# Patient Record
Sex: FEMALE | Race: BLACK OR AFRICAN AMERICAN | NOT HISPANIC OR LATINO | Employment: OTHER | RURAL
[De-identification: names, ages, dates, MRNs, and addresses within clinical notes are randomized per-mention and may not be internally consistent; named-entity substitution may affect disease eponyms.]

---

## 2020-08-27 ENCOUNTER — HISTORICAL (OUTPATIENT)
Dept: ADMINISTRATIVE | Facility: HOSPITAL | Age: 70
End: 2020-08-27

## 2021-06-02 ENCOUNTER — HOSPITAL ENCOUNTER (EMERGENCY)
Facility: HOSPITAL | Age: 71
Discharge: HOME | End: 2021-06-02
Attending: EMERGENCY MEDICINE
Payer: MEDICARE

## 2021-06-02 VITALS
HEART RATE: 89 BPM | SYSTOLIC BLOOD PRESSURE: 157 MMHG | WEIGHT: 157 LBS | BODY MASS INDEX: 28.89 KG/M2 | RESPIRATION RATE: 18 BRPM | HEIGHT: 62 IN | OXYGEN SATURATION: 99 % | TEMPERATURE: 99 F | DIASTOLIC BLOOD PRESSURE: 53 MMHG

## 2021-06-02 DIAGNOSIS — R52 PAIN: ICD-10-CM

## 2021-06-02 DIAGNOSIS — M16.0 OSTEOARTHRITIS OF BOTH HIPS, UNSPECIFIED OSTEOARTHRITIS TYPE: ICD-10-CM

## 2021-06-02 DIAGNOSIS — I87.2 VENOUS INSUFFICIENCY OF BOTH LOWER EXTREMITIES: Primary | ICD-10-CM

## 2021-06-02 PROCEDURE — 99282 EMERGENCY DEPT VISIT SF MDM: CPT | Performed by: EMERGENCY MEDICINE

## 2021-06-02 PROCEDURE — 99283 EMERGENCY DEPT VISIT LOW MDM: CPT | Performed by: EMERGENCY MEDICINE

## 2021-06-02 PROCEDURE — 99284 EMERGENCY DEPT VISIT MOD MDM: CPT | Mod: 25

## 2021-06-02 RX ORDER — POTASSIUM CHLORIDE 1125 MG/1
20 TABLET, EXTENDED RELEASE ORAL DAILY
COMMUNITY

## 2021-06-02 RX ORDER — LATANOPROST 50 UG/ML
1 SOLUTION/ DROPS OPHTHALMIC NIGHTLY
COMMUNITY

## 2021-06-02 RX ORDER — FERROUS SULFATE 325(65) MG
325 TABLET, DELAYED RELEASE (ENTERIC COATED) ORAL
COMMUNITY

## 2021-06-02 RX ORDER — FUROSEMIDE 20 MG/1
20 TABLET ORAL DAILY
COMMUNITY

## 2021-06-02 RX ORDER — DEXAMETHASONE 1.5 MG/1
1.5 TABLET ORAL
COMMUNITY

## 2021-06-02 RX ORDER — HYDROCHLOROTHIAZIDE 25 MG/1
25 TABLET ORAL DAILY
COMMUNITY

## 2021-06-02 RX ORDER — METFORMIN HYDROCHLORIDE EXTENDED-RELEASE TABLETS 1000 MG/1
1000 TABLET, FILM COATED, EXTENDED RELEASE ORAL
COMMUNITY

## 2021-06-02 RX ORDER — TIZANIDINE 2 MG/1
4 TABLET ORAL EVERY 6 HOURS PRN
COMMUNITY

## 2021-06-02 RX ORDER — FLUOROMETHOLONE 1 MG/ML
1 SUSPENSION/ DROPS OPHTHALMIC 2 TIMES DAILY
COMMUNITY

## 2021-06-02 RX ORDER — QUINAPRIL 40 MG/1
40 TABLET ORAL NIGHTLY
COMMUNITY

## 2021-06-02 RX ORDER — NAPROXEN SODIUM 220 MG/1
81 TABLET, FILM COATED ORAL DAILY
COMMUNITY

## 2021-06-02 RX ORDER — LANOLIN ALCOHOL/MO/W.PET/CERES
400 CREAM (GRAM) TOPICAL DAILY
COMMUNITY

## 2021-06-02 RX ORDER — ATORVASTATIN CALCIUM 20 MG/1
20 TABLET, FILM COATED ORAL DAILY
COMMUNITY

## 2021-07-29 ENCOUNTER — HOSPITAL ENCOUNTER (OUTPATIENT)
Dept: RADIOLOGY | Facility: HOSPITAL | Age: 71
Discharge: HOME | End: 2021-07-29
Attending: ORTHOPAEDIC SURGERY
Payer: MEDICARE

## 2021-07-29 ENCOUNTER — OFFICE VISIT (OUTPATIENT)
Dept: ORTHOPEDICS | Facility: CLINIC | Age: 71
End: 2021-07-29
Payer: MEDICARE

## 2021-07-29 VITALS — WEIGHT: 157 LBS | HEIGHT: 62 IN | BODY MASS INDEX: 28.89 KG/M2

## 2021-07-29 DIAGNOSIS — M25.572 BILATERAL ANKLE PAIN: ICD-10-CM

## 2021-07-29 DIAGNOSIS — M25.571 BILATERAL ANKLE PAIN: Primary | ICD-10-CM

## 2021-07-29 DIAGNOSIS — R60.0 EDEMA OF BOTH LOWER EXTREMITIES: ICD-10-CM

## 2021-07-29 DIAGNOSIS — M25.572 BILATERAL ANKLE PAIN: Primary | ICD-10-CM

## 2021-07-29 DIAGNOSIS — M25.571 BILATERAL ANKLE PAIN: ICD-10-CM

## 2021-07-29 PROCEDURE — 99203 OFFICE O/P NEW LOW 30 MIN: CPT | Mod: ,,, | Performed by: ORTHOPAEDIC SURGERY

## 2021-07-29 PROCEDURE — 73610 PR  X-RAY ANKLE 3+ VW: ICD-10-PCS | Mod: 26,LT,, | Performed by: ORTHOPAEDIC SURGERY

## 2021-07-29 PROCEDURE — 99203 PR OFFICE/OUTPT VISIT, NEW, LEVL III, 30-44 MIN: ICD-10-PCS | Mod: ,,, | Performed by: ORTHOPAEDIC SURGERY

## 2021-07-29 PROCEDURE — 73610 X-RAY EXAM OF ANKLE: CPT | Mod: 26,RT,, | Performed by: ORTHOPAEDIC SURGERY

## 2021-07-29 PROCEDURE — 73610 X-RAY EXAM OF ANKLE: CPT | Mod: TC,50

## 2021-07-29 PROCEDURE — 73610 X-RAY EXAM OF ANKLE: CPT | Mod: 26,LT,, | Performed by: ORTHOPAEDIC SURGERY

## 2021-09-28 ENCOUNTER — OFFICE VISIT (OUTPATIENT)
Dept: VASCULAR SURGERY | Facility: CLINIC | Age: 71
End: 2021-09-28
Payer: MEDICARE

## 2021-09-28 VITALS
BODY MASS INDEX: 27.3 KG/M2 | HEIGHT: 62 IN | SYSTOLIC BLOOD PRESSURE: 130 MMHG | DIASTOLIC BLOOD PRESSURE: 60 MMHG | HEART RATE: 88 BPM | RESPIRATION RATE: 12 BRPM | WEIGHT: 148.38 LBS

## 2021-09-28 DIAGNOSIS — M79.604 LEG PAIN, BILATERAL: Primary | ICD-10-CM

## 2021-09-28 DIAGNOSIS — R60.0 LOWER EXTREMITY EDEMA: ICD-10-CM

## 2021-09-28 DIAGNOSIS — M79.605 LEG PAIN, BILATERAL: Primary | ICD-10-CM

## 2021-09-28 DIAGNOSIS — M25.571 BILATERAL ANKLE PAIN: ICD-10-CM

## 2021-09-28 DIAGNOSIS — M25.572 BILATERAL ANKLE PAIN: ICD-10-CM

## 2021-09-28 DIAGNOSIS — L81.9 HYPERPIGMENTATION OF SKIN: ICD-10-CM

## 2021-09-28 PROCEDURE — 4010F PR ACE/ARB THEARPY RXD/TAKEN: ICD-10-PCS | Mod: CPTII,,, | Performed by: FAMILY MEDICINE

## 2021-09-28 PROCEDURE — 1160F RVW MEDS BY RX/DR IN RCRD: CPT | Mod: CPTII,,, | Performed by: FAMILY MEDICINE

## 2021-09-28 PROCEDURE — 3008F BODY MASS INDEX DOCD: CPT | Mod: CPTII,,, | Performed by: FAMILY MEDICINE

## 2021-09-28 PROCEDURE — 4010F ACE/ARB THERAPY RXD/TAKEN: CPT | Mod: CPTII,,, | Performed by: FAMILY MEDICINE

## 2021-09-28 PROCEDURE — 1160F PR REVIEW ALL MEDS BY PRESCRIBER/CLIN PHARMACIST DOCUMENTED: ICD-10-PCS | Mod: CPTII,,, | Performed by: FAMILY MEDICINE

## 2021-09-28 PROCEDURE — 99215 OFFICE O/P EST HI 40 MIN: CPT | Mod: PBBFAC | Performed by: FAMILY MEDICINE

## 2021-09-28 PROCEDURE — 1159F PR MEDICATION LIST DOCUMENTED IN MEDICAL RECORD: ICD-10-PCS | Mod: CPTII,,, | Performed by: FAMILY MEDICINE

## 2021-09-28 PROCEDURE — 3078F DIAST BP <80 MM HG: CPT | Mod: CPTII,,, | Performed by: FAMILY MEDICINE

## 2021-09-28 PROCEDURE — 99203 OFFICE O/P NEW LOW 30 MIN: CPT | Mod: S$PBB,,, | Performed by: FAMILY MEDICINE

## 2021-09-28 PROCEDURE — 3078F PR MOST RECENT DIASTOLIC BLOOD PRESSURE < 80 MM HG: ICD-10-PCS | Mod: CPTII,,, | Performed by: FAMILY MEDICINE

## 2021-09-28 PROCEDURE — 3075F PR MOST RECENT SYSTOLIC BLOOD PRESS GE 130-139MM HG: ICD-10-PCS | Mod: CPTII,,, | Performed by: FAMILY MEDICINE

## 2021-09-28 PROCEDURE — 1159F MED LIST DOCD IN RCRD: CPT | Mod: CPTII,,, | Performed by: FAMILY MEDICINE

## 2021-09-28 PROCEDURE — 99203 PR OFFICE/OUTPT VISIT, NEW, LEVL III, 30-44 MIN: ICD-10-PCS | Mod: S$PBB,,, | Performed by: FAMILY MEDICINE

## 2021-09-28 PROCEDURE — 3075F SYST BP GE 130 - 139MM HG: CPT | Mod: CPTII,,, | Performed by: FAMILY MEDICINE

## 2021-09-28 PROCEDURE — 3008F PR BODY MASS INDEX (BMI) DOCUMENTED: ICD-10-PCS | Mod: CPTII,,, | Performed by: FAMILY MEDICINE

## 2021-09-28 RX ORDER — ISOPROPYL ALCOHOL 0.75 G/1
SWAB TOPICAL
COMMUNITY
Start: 2021-09-10

## 2021-09-28 RX ORDER — CALCIUM CITRATE/VITAMIN D3 200MG-6.25
TABLET ORAL
COMMUNITY
Start: 2021-09-08

## 2021-09-28 RX ORDER — LANCETS 33 GAUGE
EACH MISCELLANEOUS
COMMUNITY
Start: 2021-09-13

## 2021-10-13 ENCOUNTER — HOSPITAL ENCOUNTER (OUTPATIENT)
Dept: RADIOLOGY | Facility: HOSPITAL | Age: 71
Discharge: HOME | End: 2021-10-13
Attending: FAMILY MEDICINE
Payer: MEDICARE

## 2021-10-13 ENCOUNTER — OFFICE VISIT (OUTPATIENT)
Dept: VASCULAR SURGERY | Facility: CLINIC | Age: 71
End: 2021-10-13
Payer: MEDICARE

## 2021-10-13 VITALS
WEIGHT: 148 LBS | BODY MASS INDEX: 27.23 KG/M2 | HEIGHT: 62 IN | SYSTOLIC BLOOD PRESSURE: 120 MMHG | HEART RATE: 80 BPM | DIASTOLIC BLOOD PRESSURE: 60 MMHG | RESPIRATION RATE: 14 BRPM

## 2021-10-13 DIAGNOSIS — R60.0 LOWER EXTREMITY EDEMA: ICD-10-CM

## 2021-10-13 DIAGNOSIS — M79.605 LEG PAIN, BILATERAL: Primary | ICD-10-CM

## 2021-10-13 DIAGNOSIS — L81.9 HYPERPIGMENTATION OF SKIN: ICD-10-CM

## 2021-10-13 DIAGNOSIS — M79.604 LEG PAIN, BILATERAL: Primary | ICD-10-CM

## 2021-10-13 PROCEDURE — 1159F PR MEDICATION LIST DOCUMENTED IN MEDICAL RECORD: ICD-10-PCS | Mod: CPTII,,, | Performed by: FAMILY MEDICINE

## 2021-10-13 PROCEDURE — 99214 OFFICE O/P EST MOD 30 MIN: CPT | Mod: S$PBB,,, | Performed by: FAMILY MEDICINE

## 2021-10-13 PROCEDURE — 3008F BODY MASS INDEX DOCD: CPT | Mod: CPTII,,, | Performed by: FAMILY MEDICINE

## 2021-10-13 PROCEDURE — 3074F SYST BP LT 130 MM HG: CPT | Mod: CPTII,,, | Performed by: FAMILY MEDICINE

## 2021-10-13 PROCEDURE — 4010F ACE/ARB THERAPY RXD/TAKEN: CPT | Mod: CPTII,,, | Performed by: FAMILY MEDICINE

## 2021-10-13 PROCEDURE — 93970 EXTREMITY STUDY: CPT | Mod: 26,,, | Performed by: FAMILY MEDICINE

## 2021-10-13 PROCEDURE — 93970 US VENOUS REFLUX STUDY BILATERAL: ICD-10-PCS | Mod: 26,,, | Performed by: FAMILY MEDICINE

## 2021-10-13 PROCEDURE — 1159F MED LIST DOCD IN RCRD: CPT | Mod: CPTII,,, | Performed by: FAMILY MEDICINE

## 2021-10-13 PROCEDURE — 3074F PR MOST RECENT SYSTOLIC BLOOD PRESSURE < 130 MM HG: ICD-10-PCS | Mod: CPTII,,, | Performed by: FAMILY MEDICINE

## 2021-10-13 PROCEDURE — 3008F PR BODY MASS INDEX (BMI) DOCUMENTED: ICD-10-PCS | Mod: CPTII,,, | Performed by: FAMILY MEDICINE

## 2021-10-13 PROCEDURE — 3078F DIAST BP <80 MM HG: CPT | Mod: CPTII,,, | Performed by: FAMILY MEDICINE

## 2021-10-13 PROCEDURE — 4010F PR ACE/ARB THEARPY RXD/TAKEN: ICD-10-PCS | Mod: CPTII,,, | Performed by: FAMILY MEDICINE

## 2021-10-13 PROCEDURE — 1160F RVW MEDS BY RX/DR IN RCRD: CPT | Mod: CPTII,,, | Performed by: FAMILY MEDICINE

## 2021-10-13 PROCEDURE — 1160F PR REVIEW ALL MEDS BY PRESCRIBER/CLIN PHARMACIST DOCUMENTED: ICD-10-PCS | Mod: CPTII,,, | Performed by: FAMILY MEDICINE

## 2021-10-13 PROCEDURE — 99215 OFFICE O/P EST HI 40 MIN: CPT | Mod: PBBFAC,25 | Performed by: FAMILY MEDICINE

## 2021-10-13 PROCEDURE — 3078F PR MOST RECENT DIASTOLIC BLOOD PRESSURE < 80 MM HG: ICD-10-PCS | Mod: CPTII,,, | Performed by: FAMILY MEDICINE

## 2021-10-13 PROCEDURE — 93970 EXTREMITY STUDY: CPT | Mod: TC

## 2021-10-13 PROCEDURE — 99214 PR OFFICE/OUTPT VISIT, EST, LEVL IV, 30-39 MIN: ICD-10-PCS | Mod: S$PBB,,, | Performed by: FAMILY MEDICINE

## 2022-10-26 ENCOUNTER — HOSPITAL ENCOUNTER (OUTPATIENT)
Dept: RADIOLOGY | Facility: HOSPITAL | Age: 72
Discharge: HOME | End: 2022-10-26
Payer: MEDICARE

## 2022-10-26 VITALS — HEIGHT: 62 IN | WEIGHT: 134 LBS | BODY MASS INDEX: 24.66 KG/M2

## 2022-10-26 DIAGNOSIS — Z12.31 SCREENING MAMMOGRAM, ENCOUNTER FOR: ICD-10-CM

## 2022-10-26 PROCEDURE — 77067 SCR MAMMO BI INCL CAD: CPT | Mod: TC

## 2023-03-03 ENCOUNTER — HOSPITAL ENCOUNTER (EMERGENCY)
Facility: HOSPITAL | Age: 73
Discharge: HOME | End: 2023-03-03
Attending: SPECIALIST
Payer: MEDICARE

## 2023-03-03 VITALS
TEMPERATURE: 98 F | HEART RATE: 91 BPM | RESPIRATION RATE: 18 BRPM | DIASTOLIC BLOOD PRESSURE: 61 MMHG | SYSTOLIC BLOOD PRESSURE: 135 MMHG | WEIGHT: 134 LBS | BODY MASS INDEX: 24.51 KG/M2

## 2023-03-03 DIAGNOSIS — S93.401A SPRAIN OF RIGHT ANKLE, UNSPECIFIED LIGAMENT, INITIAL ENCOUNTER: ICD-10-CM
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
## 2023-03-03 DIAGNOSIS — S70.01XA CONTUSION OF RIGHT HIP, INITIAL ENCOUNTER: Primary | ICD-10-CM

## 2023-03-03 DIAGNOSIS — S80.01XA CONTUSION OF RIGHT KNEE, INITIAL ENCOUNTER: ICD-10-CM

## 2023-03-03 LAB — GLUCOSE SERPL-MCNC: 193 MG/DL (ref 70–105)

## 2023-03-03 PROCEDURE — 63600175 PHARM REV CODE 636 W HCPCS: Mod: TB,JG | Performed by: SPECIALIST

## 2023-03-03 PROCEDURE — 99284 EMERGENCY DEPT VISIT MOD MDM: CPT | Mod: 25

## 2023-03-03 PROCEDURE — 99284 EMERGENCY DEPT VISIT MOD MDM: CPT | Performed by: SPECIALIST

## 2023-03-03 PROCEDURE — 82962 GLUCOSE BLOOD TEST: CPT

## 2023-03-03 PROCEDURE — 96374 THER/PROPH/DIAG INJ IV PUSH: CPT

## 2023-03-03 PROCEDURE — 96375 TX/PRO/DX INJ NEW DRUG ADDON: CPT

## 2023-03-03 RX ORDER — HYDROCODONE BITARTRATE AND ACETAMINOPHEN 5; 325 MG/1; MG/1
1 TABLET ORAL EVERY 8 HOURS PRN
Qty: 12 TABLET | Refills: 0 | Status: SHIPPED | OUTPATIENT
Start: 2023-03-03

## 2023-03-03 RX ORDER — ONDANSETRON 2 MG/ML
4 INJECTION INTRAMUSCULAR; INTRAVENOUS
Status: COMPLETED | OUTPATIENT
Start: 2023-03-03 | End: 2023-03-03

## 2023-03-03 RX ORDER — MORPHINE SULFATE 2 MG/ML
2 INJECTION, SOLUTION INTRAMUSCULAR; INTRAVENOUS
Status: COMPLETED | OUTPATIENT
Start: 2023-03-03 | End: 2023-03-03

## 2023-03-03 RX ADMIN — ONDANSETRON 4 MG: 2 INJECTION INTRAMUSCULAR; INTRAVENOUS at 06:03

## 2023-03-03 RX ADMIN — MORPHINE SULFATE 2 MG: 2 INJECTION, SOLUTION INTRAMUSCULAR; INTRAVENOUS at 06:03

## 2023-03-04 NOTE — ED TRIAGE NOTES
Pt to Er with C/O fall and injury/pain to right leg onset Tuesday pain is a 7 on 0-10 scale. Pt states her right hip, leg, knee and foot hurts. Noted pitting edema and pt states stiffness to right leg. Pedal pulse felt to right foot.

## 2023-03-04 NOTE — ED PROVIDER NOTES
Encounter Date: 3/3/2023       History     Chief Complaint   Patient presents with    Fall    Leg Injury     Right       Patient is a 73 yo AA female who is home bound and is very weak so she requires help with ambulation.  Her daughter told her to stay in the chair and not get up while she ran to the store to  something for her.  She tried to walk and fell onto her right lower extremity. She is hurting from her right hip to her ankle which appears deformed and swollen.  Patient has a pain level of 8/10. Pain management initiated by MD.     Review of patient's allergies indicates:   Allergen Reactions    Celebrex [celecoxib] Rash     Past Medical History:   Diagnosis Date    Anemia     Arthritis     Cervical myelopathy     Colon cancer     remission    Diabetes mellitus, type 2     Encounter for blood transfusion     Glaucoma     Hypertension      Past Surgical History:   Procedure Laterality Date    BACK SURGERY      CERVICAL SPINE DISK SURGERY      CHOLECYSTECTOMY      COLON SURGERY      DILATION AND CURETTAGE OF UTERUS      x2     Family History   Problem Relation Age of Onset    Hypertension Mother     Heart failure Father     Hypertension Sister     Heart disease Brother     Diabetes Mellitus Brother     Hypertension Sister     Diabetes Mellitus Sister     Heart attack Sister     Heart disease Sister     Hypertension Sister     Heart disease Brother     Diabetes Mellitus Brother     Colon cancer Brother     Hyperlipidemia Brother      Social History     Tobacco Use    Smoking status: Never    Smokeless tobacco: Never   Substance Use Topics    Alcohol use: Never    Drug use: Never     Review of Systems   Musculoskeletal:  Positive for gait problem.        Patient is hurting in her Right hip to her right ankle and foot   All other systems reviewed and are negative.    Physical Exam     Initial Vitals [03/03/23 1800]   BP Pulse Resp Temp SpO2   135/61 91 18 98.4 °F (36.9 °C) --      MAP       --          Physical Exam    Nursing note and vitals reviewed.  Constitutional: She appears well-developed and well-nourished. No distress.   HENT:   Head: Normocephalic and atraumatic.   Eyes: Pupils are equal, round, and reactive to light.   Neck: Neck supple.   Normal range of motion.  Cardiovascular:  Normal rate.           Pulmonary/Chest: Breath sounds normal.   Abdominal: Abdomen is soft.   Musculoskeletal:      Cervical back: Normal range of motion and neck supple.      Comments: Right ankle swollen and everted to the right.  There is no noticeable difference in length however.  Patient was not moved during exam awaiting xrays.     Neurological: She is alert and oriented to person, place, and time. She has normal strength. GCS score is 15. GCS eye subscore is 4. GCS verbal subscore is 5. GCS motor subscore is 6.   Skin: Skin is warm.   Psychiatric: She has a normal mood and affect. Thought content normal.       Medical Screening Exam   See Full Note    ED Course   Procedures  Labs Reviewed   POCT GLUCOSE MONITORING CONTINUOUS - Abnormal; Notable for the following components:       Result Value    POC Glucose 193 (*)     All other components within normal limits          Imaging Results              X-Ray Foot Complete Right (Final result)  Result time 03/03/23 19:02:59      Final result by All Umana MD (03/03/23 19:02:59)                   Impression:      Lateral right ankle swelling suggestive of soft tissue injury/sprain.    Otherwise, no definite acute radiographic abnormality identified.    Place of service: [facility]      Electronically signed by: All Umana  Date:    03/03/2023  Time:    19:02               Narrative:    EXAMINATION:  XR knee complete three views right; XR foot complete three views right; XR tib fib two views right; XR knee two views right; XR hip with pelvis two views right    CLINICAL HISTORY:  Fall, pain    COMPARISON:  None available    FINDINGS:  Osteopenia  is noted.  The osseous pelvis appears grossly intact with no evidence of acute fracture or hip dislocation.  Right hip joint space appears mildly narrowed with minimal osteophytic spurring.  No suspicious osseous lesions.    Right knee joint space appears intact with no evidence of acute fracture or dislocation.  There is no joint effusion.  There is mild joint space loss with minimal osteophytic spurring.    Right lower leg appears intact with no evidence of acute tibia or fibular fracture.  No suspicious osseous or soft tissue lesions are identified.  No focal soft tissue abnormalities identified.    Right ankle joint is intact.  There is prominent lateral ankle soft tissue swelling.  There is no acute displaced fracture or evidence of dislocation.    Right foot demonstrates significant osteopenia but no definite acute displaced fracture or suspicious osseous lesion is identified.  Small degenerative plantar and posterior calcaneal enthesophytes are noted.                                       X-Ray Ankle Complete Right (Final result)  Result time 03/03/23 19:02:59      Final result by All Umana MD (03/03/23 19:02:59)                   Impression:      Lateral right ankle swelling suggestive of soft tissue injury/sprain.    Otherwise, no definite acute radiographic abnormality identified.    Place of service: [facility]      Electronically signed by: All Umana  Date:    03/03/2023  Time:    19:02               Narrative:    EXAMINATION:  XR knee complete three views right; XR foot complete three views right; XR tib fib two views right; XR knee two views right; XR hip with pelvis two views right    CLINICAL HISTORY:  Fall, pain    COMPARISON:  None available    FINDINGS:  Osteopenia is noted.  The osseous pelvis appears grossly intact with no evidence of acute fracture or hip dislocation.  Right hip joint space appears mildly narrowed with minimal osteophytic spurring.  No suspicious  osseous lesions.    Right knee joint space appears intact with no evidence of acute fracture or dislocation.  There is no joint effusion.  There is mild joint space loss with minimal osteophytic spurring.    Right lower leg appears intact with no evidence of acute tibia or fibular fracture.  No suspicious osseous or soft tissue lesions are identified.  No focal soft tissue abnormalities identified.    Right ankle joint is intact.  There is prominent lateral ankle soft tissue swelling.  There is no acute displaced fracture or evidence of dislocation.    Right foot demonstrates significant osteopenia but no definite acute displaced fracture or suspicious osseous lesion is identified.  Small degenerative plantar and posterior calcaneal enthesophytes are noted.                                       X-Ray Tibia Fibula 2 View Right (Final result)  Result time 03/03/23 19:02:59      Final result by All Umana MD (03/03/23 19:02:59)                   Impression:      Lateral right ankle swelling suggestive of soft tissue injury/sprain.    Otherwise, no definite acute radiographic abnormality identified.    Place of service: [facility]      Electronically signed by: All Umana  Date:    03/03/2023  Time:    19:02               Narrative:    EXAMINATION:  XR knee complete three views right; XR foot complete three views right; XR tib fib two views right; XR knee two views right; XR hip with pelvis two views right    CLINICAL HISTORY:  Fall, pain    COMPARISON:  None available    FINDINGS:  Osteopenia is noted.  The osseous pelvis appears grossly intact with no evidence of acute fracture or hip dislocation.  Right hip joint space appears mildly narrowed with minimal osteophytic spurring.  No suspicious osseous lesions.    Right knee joint space appears intact with no evidence of acute fracture or dislocation.  There is no joint effusion.  There is mild joint space loss with minimal osteophytic  spurring.    Right lower leg appears intact with no evidence of acute tibia or fibular fracture.  No suspicious osseous or soft tissue lesions are identified.  No focal soft tissue abnormalities identified.    Right ankle joint is intact.  There is prominent lateral ankle soft tissue swelling.  There is no acute displaced fracture or evidence of dislocation.    Right foot demonstrates significant osteopenia but no definite acute displaced fracture or suspicious osseous lesion is identified.  Small degenerative plantar and posterior calcaneal enthesophytes are noted.                                       X-Ray Knee 1 or 2 View Right (Final result)  Result time 03/03/23 19:02:59      Final result by All Umana MD (03/03/23 19:02:59)                   Impression:      Lateral right ankle swelling suggestive of soft tissue injury/sprain.    Otherwise, no definite acute radiographic abnormality identified.    Place of service: [facility]      Electronically signed by: All Umana  Date:    03/03/2023  Time:    19:02               Narrative:    EXAMINATION:  XR knee complete three views right; XR foot complete three views right; XR tib fib two views right; XR knee two views right; XR hip with pelvis two views right    CLINICAL HISTORY:  Fall, pain    COMPARISON:  None available    FINDINGS:  Osteopenia is noted.  The osseous pelvis appears grossly intact with no evidence of acute fracture or hip dislocation.  Right hip joint space appears mildly narrowed with minimal osteophytic spurring.  No suspicious osseous lesions.    Right knee joint space appears intact with no evidence of acute fracture or dislocation.  There is no joint effusion.  There is mild joint space loss with minimal osteophytic spurring.    Right lower leg appears intact with no evidence of acute tibia or fibular fracture.  No suspicious osseous or soft tissue lesions are identified.  No focal soft tissue abnormalities  identified.    Right ankle joint is intact.  There is prominent lateral ankle soft tissue swelling.  There is no acute displaced fracture or evidence of dislocation.    Right foot demonstrates significant osteopenia but no definite acute displaced fracture or suspicious osseous lesion is identified.  Small degenerative plantar and posterior calcaneal enthesophytes are noted.                                       X-Ray Hip 2 or 3 views Right (with Pelvis when performed) (Final result)  Result time 03/03/23 19:02:59      Final result by All Umana MD (03/03/23 19:02:59)                   Impression:      Lateral right ankle swelling suggestive of soft tissue injury/sprain.    Otherwise, no definite acute radiographic abnormality identified.    Place of service: [facility]      Electronically signed by: All Umana  Date:    03/03/2023  Time:    19:02               Narrative:    EXAMINATION:  XR knee complete three views right; XR foot complete three views right; XR tib fib two views right; XR knee two views right; XR hip with pelvis two views right    CLINICAL HISTORY:  Fall, pain    COMPARISON:  None available    FINDINGS:  Osteopenia is noted.  The osseous pelvis appears grossly intact with no evidence of acute fracture or hip dislocation.  Right hip joint space appears mildly narrowed with minimal osteophytic spurring.  No suspicious osseous lesions.    Right knee joint space appears intact with no evidence of acute fracture or dislocation.  There is no joint effusion.  There is mild joint space loss with minimal osteophytic spurring.    Right lower leg appears intact with no evidence of acute tibia or fibular fracture.  No suspicious osseous or soft tissue lesions are identified.  No focal soft tissue abnormalities identified.    Right ankle joint is intact.  There is prominent lateral ankle soft tissue swelling.  There is no acute displaced fracture or evidence of dislocation.    Right  foot demonstrates significant osteopenia but no definite acute displaced fracture or suspicious osseous lesion is identified.  Small degenerative plantar and posterior calcaneal enthesophytes are noted.                                       Medications   morphine injection 2 mg (2 mg Intravenous Given 3/3/23 1852)   ondansetron injection 4 mg (4 mg Intravenous Given 3/3/23 1853)     Medical Decision Making:   Initial Assessment:   73 yo wf with recent fall from standing position   Differential Diagnosis:   RLE fracture  Strain  Sprain   Clinical Tests:   Radiological Study: Ordered  ED Management:  IV morphine 2 mg and IV zofran 4 mg given for pain management during stay   Norco for home given   Follow up with primary care MD                 Clinical Impression:   Final diagnoses:  [W19.XXXA] Fall        ED Disposition Condition    Discharge Stable          ED Prescriptions       Medication Sig Dispense Start Date End Date Auth. Provider    HYDROcodone-acetaminophen (NORCO) 5-325 mg per tablet Take 1 tablet by mouth every 8 (eight) hours as needed for Pain. 12 tablet 3/3/2023 -- Danette Freitas MD          Follow-up Information       Follow up With Specialties Details Why Contact Info    Niya Vargas MD Family Medicine Schedule an appointment as soon as possible for a visit in 3 days for reevaluation [address] [place] [address] [phone]               Danette Freitas MD  03/03/23 2111

## 2023-11-01 ENCOUNTER — HOSPITAL ENCOUNTER (OUTPATIENT)
Dept: RADIOLOGY | Facility: HOSPITAL | Age: 73
Discharge: HOME | End: 2023-11-01
Attending: SPECIALIST
Payer: MEDICARE

## 2023-11-01 VITALS — HEIGHT: 62 IN | BODY MASS INDEX: 28.34 KG/M2 | WEIGHT: 154 LBS

## 2023-11-01 DIAGNOSIS — Z12.31 OTHER SCREENING MAMMOGRAM: ICD-10-CM

## 2023-11-01 PROCEDURE — 77067 SCR MAMMO BI INCL CAD: CPT | Mod: TC

## 2024-11-05 ENCOUNTER — HOSPITAL ENCOUNTER (OUTPATIENT)
Dept: RADIOLOGY | Facility: HOSPITAL | Age: 74
Discharge: HOME | End: 2024-11-05
Attending: SPECIALIST
Payer: MEDICARE

## 2024-11-05 VITALS — HEIGHT: 62 IN | WEIGHT: 150 LBS | BODY MASS INDEX: 27.6 KG/M2

## 2024-11-05 DIAGNOSIS — Z12.31 OTHER SCREENING MAMMOGRAM: ICD-10-CM

## 2024-11-05 PROCEDURE — 77063 BREAST TOMOSYNTHESIS BI: CPT | Mod: 26,,, | Performed by: RADIOLOGY

## 2024-11-05 PROCEDURE — 77067 SCR MAMMO BI INCL CAD: CPT | Mod: TC

## 2024-11-05 PROCEDURE — 77067 SCR MAMMO BI INCL CAD: CPT | Mod: 26,,, | Performed by: RADIOLOGY

## 2025-09-04 DIAGNOSIS — R25.1 TREMOR OF BOTH HANDS: Primary | ICD-10-CM
